# Patient Record
Sex: MALE | Race: WHITE | NOT HISPANIC OR LATINO | Employment: OTHER | URBAN - METROPOLITAN AREA
[De-identification: names, ages, dates, MRNs, and addresses within clinical notes are randomized per-mention and may not be internally consistent; named-entity substitution may affect disease eponyms.]

---

## 2018-03-01 ENCOUNTER — CLINICAL SUPPORT (OUTPATIENT)
Dept: GENETICS | Facility: HOSPITAL | Age: 68
End: 2018-03-01

## 2018-03-01 ENCOUNTER — LAB (OUTPATIENT)
Dept: LAB | Facility: HOSPITAL | Age: 68
End: 2018-03-01

## 2018-03-01 DIAGNOSIS — Z80.3 FAMILY HISTORY OF BREAST CANCER: Primary | ICD-10-CM

## 2018-03-01 DIAGNOSIS — Z13.79 GENETIC TESTING: Primary | ICD-10-CM

## 2018-03-01 DIAGNOSIS — Z84.89 FAMILY HISTORY OF GENETIC DISORDER: ICD-10-CM

## 2018-03-08 NOTE — PROGRESS NOTES
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 Mr. Cooper does not have a personal history of cancer.  Mr. Cooper reports that he has had one colon polyp identified in the past. Mr. Cooper was interested in pursuing single site testing for the PTEN mutation identified in his daughter. Single site testing was ordered through [facility], which is the lab that performed his daughter’s testing. Results are expected in 2-3 weeks.      PERTINENT FAMILY HISTORY: (See attached pedigree)   Daughter:  Breast cancer, 33; PTEN+    A copy of Mr. Cooper’s daughter’s genetic test result confirming the PTEN (c.253+1G>T) mutation was available for review.    RISK ASSESSMENT:  Prior to testing, there is a 50% chance that this mutation was inherited from Mr. Cooper, and a 50% chance it was inherited from his wife.  Neither family history is particularly suspicious for Cowden syndrome, the condition associated with mutations in the PTEN gene.  There is also a theoretical possibility that this could be a de linnette mutation in  and Mrs. Cooper’s daughter and wasn’t inherited from either parent.  This risk assessment is based on the family history information provided at the time of the appointment.      GENETIC COUNSELING: We reviewed the family history information in detail.  Cases of cancer follow three general patterns: sporadic, familial, and hereditary.  While most breast cancer is sporadic, some cases appear to occur in family clusters.  These cases are said to be familial and account for 10-20% of breast cancer cases.  Familial cases may be due to a combination of shared genes and environmental factors among family members.  In even fewer families, the cancer is said to be inherited, and the genes responsible for the cancer are known.      Family histories typical of hereditary cancer syndromes usually include  multiple first- and second-degree relatives diagnosed with cancer types that define a syndrome.  These cases tend to be diagnosed at younger-than-expected ages and can be bilateral or multifocal.  The cancer in these families follows an autosomal dominant inheritance pattern, which indicates the likely presence of a mutation in a cancer susceptibility gene.  Children and siblings of an individual believed to carry this mutation have a 50% chance of inheriting that mutation, thereby inheriting the increased risk to develop cancer.  These mutations can be passed down from the maternal or the paternal lineage.    Hereditary breast cancer accounts for 5-10% of all cases of breast cancer.  A significant proportion of hereditary breast cancer can be attributed to mutations in the BRCA1 and BRCA2 genes, however there are other genes known to contribute to cancer risk.  Mr. Cooper’s daughter underwent testing for 9 genes known to be associated with an increased breast cancer risk and was found to have a pathogenic mutation in the PTEN gene.      Mutations in the PTEN gene are known to cause a condition called Cowden syndrome (CS). CS is characterized by multiple noncancerous, tumor-like growths (called hamartomas) and an increased risk for multiple malignancies including breast cancer, thyroid cancer, and endometrial cancer (lining of the uterus). Individuals with CS may develop hamartomas. These growths are most commonly found on the skin and mucous membranes, but can also occur in the intestinal tract and other parts of the body. Abnormal growths on the skin and mucous membranes typically appear by a person’s late twenties. Other features of CS can include skin and mucosal lesions, fibrocystic breast disease, uterine abnormalities, tumors of the cerebellum or kidney, macrocephaly (large head size), and in some cases, learning disabilities and autism. There is variability within the syndrome; therefore, the level of  involvement may vary among affected individuals.  Cowden syndrome is inherited in a dominant fashion; therefore, individuals who are diagnosed with the condition have a 50% chance of passing the gene mutation to his/her offspring.      Cancer Risk for PTEN Mutation Carriers:  • Female breast cancer: 25-50% (some studies have reported as high as 85%)  • Endometrial cancer- Up to 28%  • Thyroid cancer- Up to 35%  • Colorectal cancer- Up to 16%  • Renal (kidney) cancer- Up to 35%    GENETIC TESTING:  The risks, benefits and limitations of genetic testing and implications for clinical management following testing were reviewed.  DNA test results can influence decisions regarding screening, prevention and surgical management.  Genetic testing can have significant psychological implications for both individuals and families.  Also discussed was the possibility of employment and insurance discrimination based on genetic test results and the laws in place to prevent this. The implications of a positive or negative test result were discussed. Mr. Cooper opted to pursue testing for the mutation identified in his daughter.       PLAN: Mr. Cooper opted to pursue single site PTEN testing through [facility].  Results are expected in 2-3 weeks at which time I will contact Mr. Cooper by telephone. He is welcome to contact us in the meantime with any questions or concerns.     Milagros Menon MS, [facility], [facility]  Licensed Certified Genetic Counselor

## 2018-06-01 ENCOUNTER — DOCUMENTATION (OUTPATIENT)
Dept: GENETICS | Facility: HOSPITAL | Age: 68
End: 2018-06-01